# Patient Record
Sex: MALE | Race: BLACK OR AFRICAN AMERICAN | NOT HISPANIC OR LATINO | Employment: STUDENT | URBAN - METROPOLITAN AREA
[De-identification: names, ages, dates, MRNs, and addresses within clinical notes are randomized per-mention and may not be internally consistent; named-entity substitution may affect disease eponyms.]

---

## 2017-01-03 DIAGNOSIS — E10.65 TYPE 1 DIABETES MELLITUS WITH HYPERGLYCEMIA: Primary | ICD-10-CM

## 2017-01-03 RX ORDER — INSULIN LISPRO 100 [IU]/ML
INJECTION, SOLUTION INTRAVENOUS; SUBCUTANEOUS
Qty: 30 ML | Refills: 1 | Status: SHIPPED | OUTPATIENT
Start: 2017-01-03 | End: 2018-01-03

## 2017-01-03 NOTE — PROGRESS NOTES
Attempted to call mom back on 2 numbers listed in chart.  No answer.  Will send Rx for Humalog to pharmacy.  Advised to go to ER if out of insulin and unable to fill RX due to no insurance.

## 2017-01-11 ENCOUNTER — TELEPHONE (OUTPATIENT)
Dept: PEDIATRIC ENDOCRINOLOGY | Facility: CLINIC | Age: 22
End: 2017-01-11

## 2017-01-11 NOTE — TELEPHONE ENCOUNTER
----- Message from Sina Phillips sent at 1/11/2017  2:41 PM CST -----  Contact: Pt   Pt would like a call back from nurse    Would like top speak in ref to test stripes.  Would like to switch to ultra touch stripes.    Can be reached at [phone]

## 2017-02-22 ENCOUNTER — LAB VISIT (OUTPATIENT)
Dept: LAB | Facility: HOSPITAL | Age: 22
End: 2017-02-22
Attending: PEDIATRICS
Payer: COMMERCIAL

## 2017-02-22 ENCOUNTER — OFFICE VISIT (OUTPATIENT)
Dept: PEDIATRIC NEPHROLOGY | Facility: CLINIC | Age: 22
End: 2017-02-22
Payer: COMMERCIAL

## 2017-02-22 ENCOUNTER — OFFICE VISIT (OUTPATIENT)
Dept: PEDIATRIC ENDOCRINOLOGY | Facility: CLINIC | Age: 22
End: 2017-02-22
Payer: COMMERCIAL

## 2017-02-22 VITALS
SYSTOLIC BLOOD PRESSURE: 137 MMHG | TEMPERATURE: 97 F | DIASTOLIC BLOOD PRESSURE: 78 MMHG | WEIGHT: 144.38 LBS | HEIGHT: 64 IN | BODY MASS INDEX: 24.65 KG/M2 | HEART RATE: 83 BPM

## 2017-02-22 DIAGNOSIS — I15.2 HYPERTENSION DUE TO ENDOCRINE DISORDER: ICD-10-CM

## 2017-02-22 DIAGNOSIS — E10.65 TYPE 1 DIABETES MELLITUS WITH HYPERGLYCEMIA: Primary | ICD-10-CM

## 2017-02-22 DIAGNOSIS — I15.2 HYPERTENSION DUE TO ENDOCRINE DISORDER: Primary | ICD-10-CM

## 2017-02-22 LAB
BACTERIA #/AREA URNS AUTO: ABNORMAL /HPF
BILIRUB UR QL STRIP: NEGATIVE
CLARITY UR REFRACT.AUTO: CLEAR
COLOR UR AUTO: YELLOW
CREAT UR-MCNC: 188 MG/DL
GLUCOSE UR QL STRIP: ABNORMAL
HGB UR QL STRIP: NEGATIVE
KETONES UR QL STRIP: ABNORMAL
LEUKOCYTE ESTERASE UR QL STRIP: ABNORMAL
MICROALBUMIN UR DL<=1MG/L-MCNC: 10 UG/ML
MICROALBUMIN/CREATININE RATIO: 5.3 UG/MG
MICROSCOPIC COMMENT: ABNORMAL
NITRITE UR QL STRIP: NEGATIVE
PH UR STRIP: 7 [PH] (ref 5–8)
PROT UR QL STRIP: NEGATIVE
RBC #/AREA URNS AUTO: 1 /HPF (ref 0–4)
SP GR UR STRIP: 1.02 (ref 1–1.03)
SQUAMOUS #/AREA URNS AUTO: 1 /HPF
URN SPEC COLLECT METH UR: ABNORMAL
UROBILINOGEN UR STRIP-ACNC: 2 EU/DL
WBC #/AREA URNS AUTO: 11 /HPF (ref 0–5)

## 2017-02-22 PROCEDURE — 99999 PR PBB SHADOW E&M-EST. PATIENT-LVL I: CPT | Mod: PBBFAC,,, | Performed by: PEDIATRICS

## 2017-02-22 PROCEDURE — 3078F DIAST BP <80 MM HG: CPT | Mod: S$GLB,,, | Performed by: PEDIATRICS

## 2017-02-22 PROCEDURE — 99214 OFFICE O/P EST MOD 30 MIN: CPT | Mod: S$GLB,,, | Performed by: PEDIATRICS

## 2017-02-22 PROCEDURE — 99999 PR PBB SHADOW E&M-EST. PATIENT-LVL IV: CPT | Mod: PBBFAC,,, | Performed by: PEDIATRICS

## 2017-02-22 PROCEDURE — 3066F NEPHROPATHY DOC TX: CPT | Mod: S$GLB,,, | Performed by: PEDIATRICS

## 2017-02-22 PROCEDURE — 3046F HEMOGLOBIN A1C LEVEL >9.0%: CPT | Mod: S$GLB,,, | Performed by: PEDIATRICS

## 2017-02-22 PROCEDURE — 2022F DILAT RTA XM EVC RTNOPTHY: CPT | Mod: S$GLB,,, | Performed by: PEDIATRICS

## 2017-02-22 PROCEDURE — 82570 ASSAY OF URINE CREATININE: CPT

## 2017-02-22 PROCEDURE — 99213 OFFICE O/P EST LOW 20 MIN: CPT | Mod: S$GLB,,, | Performed by: PEDIATRICS

## 2017-02-22 PROCEDURE — 3075F SYST BP GE 130 - 139MM HG: CPT | Mod: S$GLB,,, | Performed by: PEDIATRICS

## 2017-02-22 PROCEDURE — 81001 URINALYSIS AUTO W/SCOPE: CPT

## 2017-02-22 PROCEDURE — 3045F PR MOST RECENT HEMOGLOBIN A1C LEVEL 7.0-9.0%: CPT | Mod: S$GLB,,, | Performed by: PEDIATRICS

## 2017-02-22 NOTE — PROGRESS NOTES
"Informant: mother     Reliability: fair     Current Medications:     Current Outpatient Prescriptions on File Prior to Visit   Medication Sig    alcohol swabs (IV PREP WIPES) PadM Apply 1 each topically as needed.    blood sugar diagnostic (ONETOUCH ULTRA TEST) Strp TEST BLOOD SIX TIMES DAILY    diabetic supplies, miscellan. Misc [place] pump batteries    glucagon (human recombinant) inj 1mg/mL kit Inject 1mg SQ as needed for seizure or unconsciousness    insulin aspart (NOVOLOG) 100 unit/mL injection Place 200units in pump every 3 days    insulin lispro (HUMALOG) 100 unit/mL injection Place 200 unit into pump every 3 days    insulin needles, disposable, (BD ULTRA-FINE MAGO PEN NEEDLES) 32 x 5/32 " Ndle Inject insulin 4-6 times daily    IV administration set (INFUSION SET 23" 6MM) ISet Insert infusion set every 2-3 days    LANTUS SOLOSTAR 100 unit/mL (3 mL) InPn pen INJECT UP TO 40 UNITS DAILY AS DIRECTED BY DOCTOR    lisinopril 10 MG tablet Take 1 tablet (10 mg total) by mouth once daily.    lisinopril 10 MG tablet TAKE 1 TABLET BY MOUTH ONCE DAILY    methylphenidate (CONCERTA) 36 MG CR tablet Take 36 mg by mouth every morning.    quetiapine (SEROQUEL) 25 MG Tab Take 25 mg by mouth nightly. at bedtime.    SAFETY SEAL LANCETS 30 gauge Misc USE LANCETS AS DIRECTED BY PHYSICIAN TO TEST BLOOD GLUCOSE.    subcutaneous insulin pump (ANIMAS 2020 INSULIN PUMP) [facility] [place] 2.0reservoir, change every 2-3 days    SURE COMFORT LANCETS 30 gauge Misc USE TO CHECK BLOOD SUGAR SIX TIMES DAILY    transparent dressings (TEGADERM) 3 1/2 X 4 " Bndg Apply over infusion set every 2-3 days    urine glucose-ketones test (KETO-DIASTIX) Strp Check urine ketones when BG>250 two times in a row     No current facility-administered medications on file prior to visit.         HPI:     Chief Complaint:  Amandeep Palencia III is a 21 y.o. old male for follow up Hypertension and type 1 diabetes mellitus. Has been doing well. No " "current complaints.. Denies any headaches, blurry vision or dizziness. His BP today has been 137/78 mm of Hg. According to mom his BPs at home have been 130 to 140/ 70 to 80 mm of Hg. He says his blood sugars have been ranging between 90 to 100 mg/dl.   Meds: Lisinopril 10 mg once daily.    Review of Systems:     Constitutional: Negative for change in activity, appetite, weight loss, or excessive weight gain. Denies fever, body aches, malaise or fatigue     HEENT: Negative for headaches, dizziness or blurry vision. No sore throat, nose bleeds, ear aches, or hearing loss     Respiratory: Denies cough, hemoptysis, shortness of breath, or wheezing.     Cardiovascular: Denies chest pains, syncope, shortness of breath or accustomed extension, peripheral edema or palpitations.      Gastrointestinal: Negative for abdominal pain, hematoohezia, nausea, vomiting, diarrhea, constipation, or change in bowel habits.      Genitourinary: No urinary symptoms such as dysuria, frequency or urgency. No enuresis discoloration or change in urine output.     Musculoskeletal: Denies joint pain, swelling, edema, muscle cramps, or weakness.      Skin: Negative for skin rash      Neurologic: No seizures, paralysis, speech difficulties, or vertigo.     Psychiatric/Behavioral: Negative      Physical Exam    Vitals:    02/22/17 1049   BP: 137/78   BP Location: Right arm   Patient Position: Sitting   BP Method: Automatic   Pulse: 83   Temp: 97.4 °F (36.3 °C)   TempSrc: Tympanic   Weight: 65.5 kg (144 lb 6.4 oz)   Height: 5' 4.17" (1.63 m)    Body mass index is 24.65 kg/(m^2).      General Appearance: Moderately built and nourished, afebrile, alert, oriented, and in no acute distress.     HEENT: Normocephalic, throat clear, mucosa moist, no discoloration of sclera, no periorbital edema or puffiness of face.     Respiratory: No respiratory distress, breath sounds normal, no reles or wheezes  .   CVS: Regular Rate and rhythm with normal beat sounds " and no murmer.Manual /72 mm Hg     Abdominal: Soft Non Tender with no rebound or guarding. No organomelgaly or any other mass felt.     Genitourinary: No flank tenderness or any mass felt.     Ext. Genitalia: Normal male     Musculoskeletal: Normal range of motion. No pitting edema.     Skin: No rash.     Spine: Normal       BMP  Lab Results   Component Value Date     04/22/2016    K 4.3 04/22/2016     04/22/2016    CO2 23 04/22/2016    BUN 12 04/22/2016    CREATININE 0.9 04/22/2016    CALCIUM 9.5 04/22/2016    ANIONGAP 12 04/22/2016    ESTGFRAFRICA >60.0 04/22/2016    EGFRNONAA >60.0 04/22/2016       CBC  Lab Results   Component Value Date    WBC 5.49 03/14/2006    HGB 13.3 03/14/2006    HCT 38.4 03/14/2006    MCV 73.6 (L) 03/14/2006     (H) 03/14/2006     Urinalysis  No components found for: URINALYSIS    CMP  Sodium   Date Value Ref Range Status   04/22/2016 136 136 - 145 mmol/L Final     Potassium   Date Value Ref Range Status   04/22/2016 4.3 3.5 - 5.1 mmol/L Final     Comment:     *Moderately Hemolyzed     Chloride   Date Value Ref Range Status   04/22/2016 101 95 - 110 mmol/L Final     CO2   Date Value Ref Range Status   04/22/2016 23 23 - 29 mmol/L Final     Glucose   Date Value Ref Range Status   04/22/2016 95 70 - 110 mg/dL Final     BUN, Bld   Date Value Ref Range Status   04/22/2016 12 6 - 20 mg/dL Final     Creatinine   Date Value Ref Range Status   04/22/2016 0.9 0.5 - 1.4 mg/dL Final     Calcium   Date Value Ref Range Status   04/22/2016 9.5 8.7 - 10.5 mg/dL Final     Total Protein   Date Value Ref Range Status   03/14/2006 7.4 6.0 - 8.4 gm/dl Final     Albumin   Date Value Ref Range Status   04/22/2016 3.8 3.2 - 4.7 g/dL Final     Total Bilirubin   Date Value Ref Range Status   03/14/2006 0.3 0.1 - 1.0 mg/dl Final     Alkaline Phosphatase   Date Value Ref Range Status   03/14/2006 410 (H) 86 - 315 U/L Final     AST   Date Value Ref Range Status   03/14/2006 15 0 - 37 U/L  Final     ALT   Date Value Ref Range Status   03/14/2006 20 0 - 40 U/L Final     Anion Gap   Date Value Ref Range Status   04/22/2016 12 8 - 16 mmol/L Final     eGFR if    Date Value Ref Range Status   04/22/2016 >60.0 >60 mL/min/1.73 m^2 Final     eGFR if non    Date Value Ref Range Status   04/22/2016 >60.0 >60 mL/min/1.73 m^2 Final     Comment:     Calculation used to obtain the estimated glomerular filtration  rate (eGFR) is the CKD-EPI equation. Since race is unknown   in our information system, the eGFR values for   -American and Non--American patients are given   for each creatinine result.         RENAL FUNCTION PANEL  Lab Results   Component Value Date    GLU 95 04/22/2016     04/22/2016    K 4.3 04/22/2016     04/22/2016    CO2 23 04/22/2016    BUN 12 04/22/2016    CALCIUM 9.5 04/22/2016    CREATININE 0.9 04/22/2016    ALBUMIN 3.8 04/22/2016    PHOS 3.1 04/22/2016    ESTGFRAFRICA >60.0 04/22/2016    EGFRNONAA >60.0 04/22/2016    ANIONGAP 12 04/22/2016         Assessment:     1. Hypertension due to endocrine disorder     2. Type I (juvenile type) diabetes mellitus without mention of complication, uncontrolled               Plan:   UA, Microalbumin/Cr ratio  Renal function panel  Cont Noadded salt diet  Check BP at home and call if 130/80 or higher and 110/60 or less  RTC in 6 months or PRN

## 2017-02-22 NOTE — PATIENT INSTRUCTIONS
Current Insulin Regimen    Basal rates:  Mid        1.3 units/hr             8am      0.9 units/hr             10pm    1.4 units/hr             Carb Ratio: 1:10 g    Correction Factor: 1 unit for every 50 over 120       There are several days with no insulin boluses and few blood glucose checks. Please ensure that Amandeep is checking his blood glucose level and giving insulin boluses.         Next Appointment: Will transition to adult endo in [place]      In case of emergency (for example, patient is vomiting or ketones positive), please call [phone] and ask for pediatric endocrinology on call.    For prescription refills, please call during business hours.

## 2017-02-22 NOTE — PATIENT INSTRUCTIONS
Plan:   UA, Microalbumin/Cr ratio  Renal function panel, A1C  Cont Noadded salt diet  Check BP at home and call if 130/80 or higher and 110/60 or less  Ref to adult neph  RTC in 6 months or PRN

## 2017-02-22 NOTE — LETTER
February 22, 2017      [facility]  [address]  [place] [address]  Phone: [phone]       Patient: Amandeep Palencia   YOB: 1995  Date of Visit: 02/22/2017    To Whom It May Concern:    Amandeep was at [facility] on 02/22/2017. He may return to work on 02/23/2017 with no restrictions. If you have any questions or concerns, or if I can be of further assistance, please do not hesitate to contact me.    Sincerely,    Veronica Lew MA

## 2017-02-22 NOTE — PROGRESS NOTES
Amandeep Palencia III is a 21 y.o. male being seen in the pediatric endocrinology clinic today in follow up for type 1 diabetes.     He was diagnosed with type 1 diabetes in April 2009.     Interval History:   He is on CSII using Omnipod. No severe hypoglycemic events, DKA or other adverse events since last visit. Pump issues: none    Review of blood sugars from pump download/logbook, shows: very few BG checks, unable to calculate average.  Injection/infusion sites: hips. Amandeep was alone at his visit today. He is a very poor historian. When asked why he doesn't check his blood glucose, he responds that he forgets too.     Amandeep is having few episodes of hypoglycemia per week. He denies symptoms of hyperglycemia such as nocturia, blurry vision and polyuria.     Nutrition: carb counting but is not on a specified limit, giving insulin prior to meals    Review of growth chart shows: stable weight    Current insulin regimen:  Basal rates:  Mid        1.3 units/hr             8am      0.9 units/hr             10pm   1.4 units/hr             Carb Ratio: 1:15 g    Correction Factor: 1 unit for every 50 over 120     Total daily dose: ~37 units/day, 70% basal    Review of Systems:  Constitutional: Negative for fever.   HENT: Negative for congestion and sore throat.    Eyes: Negative for discharge and redness.   Respiratory: Negative for cough and shortness of breath.    Cardiovascular: Negative for chest pain.   Gastrointestinal: Negative for nausea and vomiting.   Musculoskeletal: Negative for myalgias.   Skin: Negative for rash.   Neurological: Negative for headaches.   Endocrine: see HPI and negative for -  change in hair pattern or skin changes      Past Medical/Family/Surgical History:  I have reviewed, and verified the past medical, surgical, and family history and updated as appropriate.    Social History:  He is in 12th grade     Meds:  Reviewed and reconciled.     Physical Exam:  VS reviewed  General: alert, active, in  no acute distress  Skin: normal tone and texture, no rashes, + evidence of BG monitoring on fingers   Injection Sites: normal  Eyes:  conjunctiva clear and sclera nonicteric, pupils equal and reactive to light, extraocular movements intact  Throat:  moist mucous membranes without erythema, exudates or petechiae  Neck:  supple, no lymphadenopathy, no thyromegaly  Lungs:  clear to auscultation  Heart:  regular rate and rhythm, normal S1/S2, no murmurs  Abdomen:  Abdomen soft, non-tender. No masses, organomegaly  Neuro:  normal without focal findings, DTR normal for age  Musculoskeletal:  moves all extremities equally, no edema, full range of motion    Labs:  Hemoglobin A1C   Date Value Ref Range Status   07/27/2016 9.1 (H) 4.5 - 6.2 % Final     Comment:     According to ADA guidelines, hemoglobin A1C <7.0% represents  optimal control in non-pregnant diabetic patients.  Different  metrics may apply to specific populations.   Standards of Medical Care in Diabetes - 2016.  For the purpose of screening for the presence of diabetes:  <5.7%     Consistent with the absence of diabetes  5.7-6.4%  Consistent with increasing risk for diabetes   (prediabetes)  >or=6.5%  Consistent with diabetes  Currently no consensus exists for use of hemoglobin A1C  for diagnosis of diabetes for children.     04/22/2016 9.8 (H) 4.5 - 6.2 % Final   06/10/2015 8.9 (H) 4.5 - 6.2 % Final       Screening tests:  Component      Latest Ref Rng 4/22/2016 6/10/2015   Cholesterol      120 - 199 mg/dL  157   Triglycerides      30 - 150 mg/dL  55   HDL      40 - 75 mg/dL  65   LDL Cholesterol      63.0 - 159.0 mg/dL  81.0   Microalbum.,U,Random      ug/mL 5.0    Creatinine, Random Ur      23.0 - 375.0 mg/dL 97.0    Microalb Creat Ratio      0.0 - 30.0 ug/mg 5.2    TSH      0.400 - 4.000 uIU/mL  0.595   TTG IgA      <20 UNITS  5   IgA      40 - 350 mg/dL  294       Eye Exam: 2015- normal per parental report    Assessment/Plan:  Amandeep is a 21 y.o. male with  T1D of ~7 years 10 months duration on ~1.1 units/kg/day. Diabetes under sub-optimal control.  A1C continues to improve though    Lab Results   Component Value Date    HGBA1C 8.5 (H) 02/22/2017       His blood sugars, basal settings, and bolus doses were reviewed for the past four weeks. We again discussed setting up reminders in the house for Amandeep to help him remember to check his blood glucose and bolus when he eats. In the future, it would be beneficial for Amandeep's guardian to be apart of the visit to ensure that we get accurate information. I am not sure how much of the visit Amandeep truly understood.      Education: blood sugar goals, and causes and consequences of prolonged elevations in blood glucose and A1C, intensive insulin therapy, insulin omission, insulin kinetics,  and goals for therapy.    Due for TSH screen- ordered today    Follow up in 2-3 months.    It was a pleasure to see your patient in clinic today. Please call with any questions or concerns.      Kalyani Galarza MD  Pediatric Endocrinologist    Over 50% of this 30 minute visit was spent in counseling/coordinating care. I counseled the family on the education topics listed above.

## 2017-02-22 NOTE — MR AVS SNAPSHOT
[facility]  [address]  [facility] [address]  Phone: [phone]                  Amandeep Palencia III   2017 11:30 AM   Office Visit    Description:  Male : 1995   Provider:  Kalyani Galarza MD   Department:  [facility]           Diagnoses this Visit        Comments    Type 1 diabetes mellitus with hyperglycemia    -  Primary            To Do List           Future Appointments        Provider Department Dept Phone    2017 12:15 PM LAB, PEDIATRICS [facility] [phone]      Goals (5 Years of Data)     None      [facility] On Call     [facility] Nurse Care Line -  Assistance  Registered nurses in the [facility] provide clinical advisement, health education, appointment booking, and other advisory services.  Call for this free service at [phone].             Medications           Message regarding Medications     Verify the changes and/or additions to your medication regime listed below are the same as discussed with your clinician today.  If any of these changes or additions are incorrect, please notify your healthcare provider.             Verify that the below list of medications is an accurate representation of the medications you are currently taking.  If none reported, the list may be blank. If incorrect, please contact your healthcare provider. Carry this list with you in case of emergency.           Current Medications     alcohol swabs (IV PREP WIPES) PadM Apply 1 each topically as needed.    blood sugar diagnostic (ONETOUCH ULTRA TEST) Strp TEST BLOOD SIX TIMES DAILY    diabetic supplies, miscellan. Misc [place] pump batteries    glucagon (human recombinant) inj 1mg/mL kit Inject 1mg SQ as needed for seizure or unconsciousness    insulin aspart (NOVOLOG) 100 unit/mL injection Place 200units in pump every 3 days    insulin lispro (HUMALOG) 100 unit/mL injection Place 200 unit into pump every  "3 days    insulin needles, disposable, (BD ULTRA-FINE MAGO PEN NEEDLES) 32 x 5/32 " Ndle Inject insulin 4-6 times daily    IV administration set (INFUSION SET 23" 6MM) ISet Insert infusion set every 2-3 days    LANTUS SOLOSTAR 100 unit/mL (3 mL) InPn pen INJECT UP TO 40 UNITS DAILY AS DIRECTED BY DOCTOR    lisinopril 10 MG tablet Take 1 tablet (10 mg total) by mouth once daily.    lisinopril 10 MG tablet TAKE 1 TABLET BY MOUTH ONCE DAILY    methylphenidate (CONCERTA) 36 MG CR tablet Take 36 mg by mouth every morning.    quetiapine (SEROQUEL) 25 MG Tab Take 25 mg by mouth nightly. at bedtime.    SAFETY SEAL LANCETS 30 gauge Misc USE LANCETS AS DIRECTED BY PHYSICIAN TO TEST BLOOD GLUCOSE.    subcutaneous insulin pump (ANIMAS 2020 INSULIN PUMP) [facility] [place] 2.0reservoir, change every 2-3 days    SURE COMFORT LANCETS 30 gauge Misc USE TO CHECK BLOOD SUGAR SIX TIMES DAILY    transparent dressings (TEGADERM) 3 1/2 X 4 " Bndg Apply over infusion set every 2-3 days    urine glucose-ketones test (KETO-DIASTIX) Strp Check urine ketones when BG>250 two times in a row           Clinical Reference Information           Allergies as of 2/22/2017     Amoxicillin    Amoxicillin      Immunizations Administered on Date of Encounter - 2/22/2017     None      [facility] Sign-Up     Activating your [facility] account is as easy as 1-2-3!     1) Visit [email or web address], select Sign Up Now, enter this activation code and your date of birth, then select Next.  [email or web address]  Expires: 4/7/2017  4:15 PM      2) Create a username and password to use when you visit [facility] in the future and select a security question in case you lose your password and select Next.    3) Enter your e-mail address and click Sign Up!    Additional Information  If you have questions, please e-mail [email or web address] or call [phone] to talk to our [facility] staff. Remember, [facility] is NOT to be used for urgent needs. For medical emergencies, dial 911.    "      Instructions    Current Insulin Regimen    Basal rates:  Mid        1.3 units/hr             8am      0.9 units/hr             10pm    1.4 units/hr             Carb Ratio: 1:10 g    Correction Factor: 1 unit for every 50 over 120       There are several days with no insulin boluses and few blood glucose checks. Please ensure that Amandeep is checking his blood glucose level and giving insulin boluses.         Next Appointment: Will transition to adult endo in [place]      In case of emergency (for example, patient is vomiting or ketones positive), please call [phone] and ask for pediatric endocrinology on call.    For prescription refills, please call during business hours.          Language Assistance Services     ATTENTION: Language assistance services are available, free of charge. Please call [phone].      ATENCIÓN: Si indiola marsha, tiene a sharp disposición servicios gratuitos de asistencia lingüística. Llame al [phone].     CHÚ Ý: N?u b?n nói Ti?ng Vi?t, có các d?ch v? h? tr? ngôn ng? mi?n phí dành cho b?n. G?i s? [phone].         [facility] complies with applicable Federal civil rights laws and does not discriminate on the basis of race, color, national origin, age, disability, or sex.

## 2017-02-22 NOTE — MR AVS SNAPSHOT
[facility]  [address]  [facility] [address]  Phone: [phone]                  Amandeep Palencia III   2017 10:30 AM   Office Visit    Description:  Male : 1995   Provider:  Tennille Lyman MD   Department:  [facility]           Reason for Visit     Follow-up           Diagnoses this Visit        Comments    Hypertension due to endocrine disorder    -  Primary     Type I (juvenile type) diabetes mellitus without mention of complication, uncontrolled                To Do List           Goals (5 Years of Data)     None      Follow-Up and Disposition     Return in about 6 months (around 2017).      [facility]     [facility] On [facility] Care Line -  Assistance  Registered nurses in the [facility] On Call Center provide clinical advisement, health education, appointment booking, and other advisory services.  Call for this free service at [phone].             Medications           Message regarding Medications     Verify the changes and/or additions to your medication regime listed below are the same as discussed with your clinician today.  If any of these changes or additions are incorrect, please notify your healthcare provider.             Verify that the below list of medications is an accurate representation of the medications you are currently taking.  If none reported, the list may be blank. If incorrect, please contact your healthcare provider. Carry this list with you in case of emergency.           Current Medications     alcohol swabs (IV PREP WIPES) PadM Apply 1 each topically as needed.    blood sugar diagnostic (ONETOUCH ULTRA TEST) Strp TEST BLOOD SIX TIMES DAILY    diabetic supplies, miscellan. Misc [place] pump batteries    glucagon (human recombinant) inj 1mg/mL kit Inject 1mg SQ as needed for seizure or unconsciousness    insulin aspart (NOVOLOG) 100 unit/mL injection Place 200units in pump every 3 days    insulin  "lispro (HUMALOG) 100 unit/mL injection Place 200 unit into pump every 3 days    insulin needles, disposable, (BD ULTRA-FINE MAGO PEN NEEDLES) 32 x 5/32 " Ndle Inject insulin 4-6 times daily    IV administration set (INFUSION SET 23" 6MM) ISet Insert infusion set every 2-3 days    LANTUS SOLOSTAR 100 unit/mL (3 mL) InPn pen INJECT UP TO 40 UNITS DAILY AS DIRECTED BY DOCTOR    lisinopril 10 MG tablet Take 1 tablet (10 mg total) by mouth once daily.    lisinopril 10 MG tablet TAKE 1 TABLET BY MOUTH ONCE DAILY    methylphenidate (CONCERTA) 36 MG CR tablet Take 36 mg by mouth every morning.    quetiapine (SEROQUEL) 25 MG Tab Take 25 mg by mouth nightly. at bedtime.    SAFETY SEAL LANCETS 30 gauge Misc USE LANCETS AS DIRECTED BY PHYSICIAN TO TEST BLOOD GLUCOSE.    subcutaneous insulin pump (ANIMAS 2020 INSULIN PUMP) Misc [place]nie 2.0reservoir, change every 2-3 days    SURE COMFORT LANCETS 30 gauge Misc USE TO CHECK BLOOD SUGAR SIX TIMES DAILY    transparent dressings (TEGADERM) 3 1/2 X 4 " Bndg Apply over infusion set every 2-3 days    urine glucose-ketones test (KETO-DIASTIX) Strp Check urine ketones when BG>250 two times in a row           Clinical Reference Information           Your Vitals Were     BP Pulse Temp    137/78 (BP Location: Right arm, Patient Position: Sitting, BP Method: Automatic) 83 97.4 °F (36.3 °C) (Tympanic)    Height Weight BMI    5' 4.17" (1.63 m) 65.5 kg (144 lb 6.4 oz) 24.65 kg/m2      Blood Pressure          Most Recent Value    BP  137/78      Allergies as of 2/22/2017     Amoxicillin    Amoxicillin      Immunizations Administered on Date of Encounter - 2/22/2017     None      Orders Placed During Today's Visit     Future Labs/Procedures Expected by Expires    Hemoglobin A1c  2/22/2017 4/23/2018    Microalbumin/creatinine urine ratio  2/22/2017 4/23/2018    Renal function panel  2/22/2017 4/23/2018    Urinalysis  2/22/2017 4/23/2018      [employer] Sign-Up     Activating your [employer] account is " as easy as 1-2-3!     1) Visit [email or web address], select Sign Up Now, enter this activation code and your date of birth, then select Next.  [email or web address]  Expires: 4/7/2017  4:15 PM      2) Create a username and password to use when you visit [facility] in the future and select a security question in case you lose your password and select Next.    3) Enter your e-mail address and click Sign Up!    Additional Information  If you have questions, please e-mail [email or web address] or call [phone] to talk to our [facility] staff. Remember, [facility] is NOT to be used for urgent needs. For medical emergencies, dial 911.         Instructions    Plan:   UA, Microalbumin/Cr ratio  Renal function panel, A1C  Cont Noadded salt diet  Check BP at home and call if 130/80 or higher and 110/60 or less  Ref to adult neph  RTC in 6 months or PRN           Language Assistance Services     ATTENTION: Language assistance services are available, free of charge. Please call [phone].      ATENCIÓN: Si habla español, tiene a sharp disposición servicios gratuitos de asistencia lingüística. Llame al [phone].     JOSÉ Ý: N?u b?n nói Ti?ng Vi?t, có các d?ch v? h? tr? ngôn ng? mi?n phí dành cho b?n. G?i s? [phone].         [facility] complies with applicable Federal civil rights laws and does not discriminate on the basis of race, color, national origin, age, disability, or sex.

## 2017-02-23 ENCOUNTER — TELEPHONE (OUTPATIENT)
Dept: PEDIATRIC NEPHROLOGY | Facility: CLINIC | Age: 22
End: 2017-02-23

## 2017-05-08 RX ORDER — BLOOD SUGAR DIAGNOSTIC
STRIP MISCELLANEOUS
Qty: 200 STRIP | Refills: 2 | Status: SHIPPED | OUTPATIENT
Start: 2017-05-08

## 2017-05-08 NOTE — TELEPHONE ENCOUNTER
----- Message from Veronica Lew MA sent at 5/8/2017  1:40 PM CDT -----  Contact: [phone] [facility]      ----- Message -----     From: Wendy Joshi     Sent: 5/8/2017   1:20 PM       To: Geovanni Auguste Staff    Refill request for test scripts

## 2017-06-19 DIAGNOSIS — E10.65 TYPE 1 DIABETES MELLITUS WITH HYPERGLYCEMIA: Primary | ICD-10-CM

## 2017-06-22 DIAGNOSIS — M25.579 ANKLE PAIN, UNSPECIFIED CHRONICITY, UNSPECIFIED LATERALITY: Primary | ICD-10-CM

## 2017-06-23 ENCOUNTER — NURSE TRIAGE (OUTPATIENT)
Dept: ADMINISTRATIVE | Facility: CLINIC | Age: 22
End: 2017-06-23

## 2017-06-23 RX ORDER — INSULIN ASPART 100 [IU]/ML
INJECTION, SOLUTION INTRAVENOUS; SUBCUTANEOUS
Qty: 30 ML | Refills: 3 | Status: SHIPPED | OUTPATIENT
Start: 2017-06-23 | End: 2018-06-23

## 2017-07-11 ENCOUNTER — TELEPHONE (OUTPATIENT)
Dept: PEDIATRIC ENDOCRINOLOGY | Facility: CLINIC | Age: 22
End: 2017-07-11

## 2017-07-11 NOTE — TELEPHONE ENCOUNTER
----- Message from Tracie Mujica sent at 7/11/2017  9:05 AM CDT -----  Contact: Rubén Qureshi [phone]  Rubén Qureshi [phone]... Calling to find out when pt will get scheduled for training on his new insulin pump.  Mom is requesting a call back.

## 2017-07-24 ENCOUNTER — CLINICAL SUPPORT (OUTPATIENT)
Dept: PEDIATRIC ENDOCRINOLOGY | Facility: CLINIC | Age: 22
End: 2017-07-24
Payer: COMMERCIAL

## 2017-07-24 DIAGNOSIS — Z46.81 INSULIN PUMP TRAINING: ICD-10-CM

## 2017-07-24 DIAGNOSIS — E10.65 TYPE 1 DIABETES MELLITUS WITH HYPERGLYCEMIA: Primary | ICD-10-CM

## 2017-07-24 PROCEDURE — G0109 DIAB MANAGE TRN IND/GROUP: HCPCS | Mod: S$GLB,,, | Performed by: PEDIATRICS

## 2017-07-25 NOTE — PROGRESS NOTES
Diabetes Education  Author: Neela Dunne RN, CDE  Date: 7/25/2017    Diabetes Education Visit  Diabetes Education Record Assessment/Progress: Initial    Diabetes Type  Diabetes Type : Type I    Diabetes History  Diabetes Diagnosis: >10 years    Nutrition  Meal Planning: 3 meals per day, snacks between meal, eats out often    Monitoring   Monitoring: One Touch Verio IQ (change to Tandem pump)  Self Monitoring : 0-4 x day  Blood Glucose Logs: No (pump download ; [place] Ping)         Current Diabetes Treatment   Current Treatment: Insulin pump (Has One Touch Pung, changing to Tandem x 2 today)    Social History  Preferred Learning Method: Demonstration, Face to Face, Hands On  Primary Support: Family (mother assist)  Educational Level: Trade School (learning disabled)        Barriers to Change  Barriers to Change: Cognitive  Learning Challenges : Below Level for Age/Development    Readiness to Learn   Readiness to Learn : Acceptance         Diabetes Education Assessment/Progress  Acute Complications (preventing, detecting, and treating acute complications): Discussion, Individual Session, Competent (verbalizes/demonstrates), Competent Family/SO, Written Materials Provided (patient able to verbalize treatment for Hypo and Hyperglycemia. does not test blood glucose though so often does not feel hypo or hyper events.)  Chronic Complications (preventing, detecting, and treating chronic complications): Discussion, Individual Session, Competent Family/SO (mom able to verblize complications which she has seen in people with diabetes but does not correlate to patients uncontrolled diabeets and risk for complications)  Diabetes Disease Process (diabetes disease process and treatment options): Discussion, Individual Session, Written Materials Provided (will be changing from [place] Ping to Tandem pump today)  Nutrition (Incorporating nutritional management into one's lifestyle): Discussion, Individual Session, Written  "Materials Provided (reviewed with patient and mom CHO food groups, portions, CHO counting,label reading. Patient not attentive . Mom reports that she counts CHO for him but there are very few entries of CHO in pump)  Physical Activity (incorporating physical activity into one's lifestyle): Discussion, Individual Session (patient works 3 days week and often physical. Cautioned risk for Hypoglycemia. Instructed mom and patient on use of Temp basal  but I doubt that they will use.)  Medications (states correct name, dose, onset, peak, duration, side effects & timing of meds): Discussion, Individual Session, Written Materials Provided (Instruction on Tandem insulin pump )  Insulin pump training  provided per Tandem protocol.   Details of pump therapy were covered with the patient.   Instructed and assisted in programming pump following  t-slim " Reference Guide  " step by step guide .   Pump Options  menu's on home screen were  reviewed in detail.   Practiced with patient  :  ·  Load and filling t-slim cartridge ,filling tubing, and filling canula after inserting infusion set  · Insertion of inset  infusion set ; connecting and disconnecting infusion set.   · Use of bolus menu: entering Blood glucose and carbs , and delivering bolus .  Patient  demonstrated  the ability to load and fill t-slim cartridge ,fill tubing , insert inset infusion set and fill canula  adequately per sterile technique.   Patient inserted the infusion set with aseptic technique and secured it to left side of abd .    Reviewed site selection, rotation . Instructed pump will alert to cartridge,infusion set and site every three days .   Discussed  storage of insulin and back-up plan if pump is broken or fails ;   . Reviewed  treatment of hypoglycemia, hyperglycemia and troubleshooting of pump  INITIAL SETTINGS transferred from One Touch Ping:      Basal rate:   12 am- 8 am = 1.3 u/hr  8 am- 10 pm = 0.9 u/hr  10 pm- 12 mn = 1.4 u/hr    Bolus settings " :    Correction Factor:  1:50  CHO Ratio: 1: 10  Blood glucose goal:  12 am-12 am 120   Active insulin: 3 hrs  Max Bolus 15 units   Max basal preset in pump to = twice the basal profile but will not exceed 15 units   Auto off : off     Monitoring (monitoring blood glucose/other parameters & using results): Discussion, Individual Session (Patient will be using One Touch Meter. advised testing 3-4 x day. Also reinforced the importance of making sure that he enters blood glucose in the correct spot on pump)  Goal Setting and Problem Solving (verbalizes behavior change strategies & sets realistic goals): Discussion, Individual Session (proficient with new tandem pump)  Behavior Change (developing personal strategies to health & behavior change): Discussion, Individual Session (Healthy lifestyle choices and compliancy in diabetes self care)  Psychosocial Issues (developing personal srategies to address psychosocial concerns): Discussion, Individual Session (agreeable to all request but learning disabilities. )    Goals  Healthy Eating: Set (entering CHo in pump)  Start Date: 07/24/17  Monitoring: Set (testing 3-4 x day)  Start Date: 07/24/17  Medications: Set (taking insulin appropiately)  Start Date: 07/24/17    Diabetes Self-Management Support Plan  Diabetes Learning: other  Other learning: diabetes education and diabetes provider apts  Exercise/Nutrition: apps  Stress Management: family, friends  Medication: pharmacy  Review Status: Patient has selected and agrees to support plan.    Diabetes Care Plan/Intervention  Education Plan/Intervention:  (will call for follow-up)    Diabetes Meal Plan  Carbohydrate Per Meal: Other    Education Units of Time   Time Spent: 90 min

## 2017-07-28 ENCOUNTER — TELEPHONE (OUTPATIENT)
Dept: PEDIATRIC ENDOCRINOLOGY | Facility: CLINIC | Age: 22
End: 2017-07-28

## 2017-10-17 ENCOUNTER — TELEPHONE (OUTPATIENT)
Dept: PEDIATRIC ENDOCRINOLOGY | Facility: CLINIC | Age: 22
End: 2017-10-17

## 2017-10-17 NOTE — TELEPHONE ENCOUNTER
----- Message from Gauri Celis sent at 10/17/2017  4:37 PM CDT -----  Contact: mom [phone]   Please fax a medical records form to mom at [phone], mom's been waiting all morning for a fax.

## 2017-10-20 ENCOUNTER — TELEPHONE (OUTPATIENT)
Dept: PEDIATRIC ENDOCRINOLOGY | Facility: CLINIC | Age: 22
End: 2017-10-20

## 2017-10-20 NOTE — TELEPHONE ENCOUNTER
----- Message from Gauri Celis sent at 10/20/2017  4:08 PM CDT -----  Contact: mom [phone]    Mom is calling to find out if faxed was received for medical records, please call mom.she has called several times.

## 2017-10-20 NOTE — TELEPHONE ENCOUNTER
----- Message from Veronica Lew MA sent at 10/19/2017  2:31 PM CDT -----  Contact: Nell, [facility], Dr. Zain Camejo would like to speak with you about pt's pump.. She will fax over a JOSE and I will take care of the records she's requesting.  ----- Message -----  From: Zully Matson  Sent: 10/19/2017   2:18 PM  To: Vibha Keita Staff    Ms. Camejo is calling because they needs records on the pt, whether he had a pump and if he was educated on the use?    She can be reached at [phone].    Thank you.    Returned call to Nell at [facility] regarding patient's care. She is requesting medical records. Instructed her to have mother call medical records . She has to sign a release form .   Inquired about insulin pump therapy. Provided name and contact information  of Tandem clinical specialist for assistance with pump.